# Patient Record
Sex: FEMALE | Race: WHITE
[De-identification: names, ages, dates, MRNs, and addresses within clinical notes are randomized per-mention and may not be internally consistent; named-entity substitution may affect disease eponyms.]

---

## 2018-08-20 ENCOUNTER — HOSPITAL ENCOUNTER (OUTPATIENT)
Dept: HOSPITAL 76 - EMS | Age: 78
Discharge: ANOTHER HOSPITAL | End: 2018-08-20
Attending: SURGERY
Payer: MEDICARE

## 2018-08-20 ENCOUNTER — HOSPITAL ENCOUNTER (OUTPATIENT)
Dept: HOSPITAL 76 - ED | Age: 78
Setting detail: OBSERVATION
LOS: 1 days | Discharge: HOME | End: 2018-08-21
Attending: ORTHOPAEDIC SURGERY | Admitting: ORTHOPAEDIC SURGERY
Payer: MEDICARE
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
## 2018-08-20 DIAGNOSIS — S82.851A: Primary | ICD-10-CM
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
## 2018-08-20 DIAGNOSIS — S99.911A: Primary | ICD-10-CM

## 2018-08-20 PROCEDURE — 99284 EMERGENCY DEPT VISIT MOD MDM: CPT

## 2018-08-20 PROCEDURE — 97162 PT EVAL MOD COMPLEX 30 MIN: CPT

## 2018-08-20 PROCEDURE — 99152 MOD SED SAME PHYS/QHP 5/>YRS: CPT

## 2018-08-20 PROCEDURE — 73600 X-RAY EXAM OF ANKLE: CPT

## 2018-08-20 PROCEDURE — 73610 X-RAY EXAM OF ANKLE: CPT

## 2018-08-20 PROCEDURE — 94770: CPT

## 2018-08-20 PROCEDURE — 27818 TREATMENT OF ANKLE FRACTURE: CPT

## 2018-08-20 PROCEDURE — 73590 X-RAY EXAM OF LOWER LEG: CPT

## 2018-08-20 PROCEDURE — 96374 THER/PROPH/DIAG INJ IV PUSH: CPT

## 2018-08-20 PROCEDURE — 97530 THERAPEUTIC ACTIVITIES: CPT

## 2018-08-20 RX ADMIN — ACETAMINOPHEN PRN MG: 500 TABLET ORAL at 21:19

## 2018-08-20 RX ADMIN — SODIUM CHLORIDE, PRESERVATIVE FREE SCH ML: 5 INJECTION INTRAVENOUS at 23:44

## 2018-08-20 NOTE — ED PHYSICIAN DOCUMENTATION
PD HPI LOWER EXT INJURY





- Stated complaint


Stated Complaint: FALL, R ANKLE PX





- Chief complaint


Chief Complaint: Trauma Ext





- History obtained from


History obtained from: Patient, Family





- History of Present Illness


PD HPI LOW EXT INJURY LOCATION: Right, Lower leg, Ankle


Type of injury: Fall (down stairs)


Where injury occurred: Home


Timing - onset: How many minutes ago (30)


Timing - duration: Minutes (30)


Timing - details: Abrupt onset


Pain level max: 9


Pain level now: 6


Improved by: Rest, Ice, Immobilization


Worsened by: Moving, Palpating


Associated symptoms: Swelling, Other (deformed).  No: Weakness, Numbness, 

Tingling


Contributing factors: No: Anticoagulated, Prior ortho surgery, Prosthetic joint

, Work related


Similar symptoms before: Has not had sx before


Recently seen: Not recently seen





Review of Systems


Ten Systems: 10 systems reviewed and negative


Constitutional: denies: Fever, Chills


Ears: denies: Ear pain


Nose: denies: Rhinorrhea / runny nose, Congestion


Throat: denies: Sore throat


Cardiac: denies: Chest pain / pressure


Respiratory: denies: Cough


GI: denies: Nausea, Vomiting, Diarrhea


Skin: denies: Rash


Musculoskeletal: denies: Neck pain, Back pain


Neurologic: denies: Headache, Head injury, LOC





PD PAST MEDICAL HISTORY





- Past Medical History


Past Medical History: Yes


Cardiovascular: High cholesterol





- Past Surgical History


Past Surgical History: No





- Present Medications


Home Medications: 


 Ambulatory Orders











 Medication  Instructions  Recorded  Confirmed


 


Atorvastatin [Lipitor]  08/20/18 














- Allergies


Allergies/Adverse Reactions: 


 Allergies











Allergy/AdvReac Type Severity Reaction Status Date / Time


 


No Known Drug Allergies Allergy   Verified 08/20/18 17:48














- Social History


Does the pt smoke?: No


Smoking Status: Never smoker


Does the pt drink ETOH?: Yes


Does the pt have substance abuse?: No





- Immunizations


Immunizations are current?: Yes





PD ED PE NORMAL





- Vitals


Vital signs reviewed: Yes





- General


General: Alert and oriented X 3, No acute distress, Well developed/nourished





- HEENT


HEENT: Moist mucous membranes





- Neck


Neck: Supple, no meningeal sign





- Cardiac


Cardiac: RRR





- Respiratory


Respiratory: No respiratory distress, Clear bilaterally





- Abdomen


Abdomen: Soft, Non tender, Non distended





- Back


Back: No spinal TTP





- Derm


Derm: Warm and dry





- Extremities


Extremities: Other (gross deformity to the R ankle c/w fracture and 

dislocation. no open fracture. NVI. strong DP pulse. )





- Neuro


Neuro: Alert and oriented X 3





- Psych


Psych: Normal mood, Normal affect





Results





- Vitals


Vitals: 


 Vital Signs - 24 hr











  08/20/18 08/20/18 08/20/18





  17:46 19:44 19:45


 


Temperature 36.1 C L  


 


Heart Rate 89 81 80


 


Respiratory 15 18 12





Rate   


 


Blood Pressure 105/72 125/74 


 


O2 Saturation 95 96 














  08/20/18 08/20/18 08/20/18





  19:47 19:52 20:01


 


Temperature   


 


Heart Rate 77 67 76


 


Respiratory 14 14 16





Rate   


 


Blood Pressure 123/70 113/61 116/60


 


O2 Saturation 95 94 100














  08/20/18





  20:13


 


Temperature 


 


Heart Rate 77


 


Respiratory 18





Rate 


 


Blood Pressure 120/70


 


O2 Saturation 94








 Oxygen











O2 Source                      Room air

















- Rads (name of study)


  ** R ankle xray


Radiology: Prelim report reviewed, EMP read contemporaneously, See rad report (

Trimalleolar fracture dislocation of the ankle. )





  ** r tib fib xray


Radiology: Prelim report reviewed, EMP read contemporaneously, See rad report (

Trimalleolar fracture dislocation of the ankle. )





  ** post reduction ankle


Radiology: Prelim report reviewed, EMP read contemporaneously, See rad report (

Interval placement of overlying cast status post reduction right ankle fracture 

dislocation.  Alignment of the ankle is near-anatomic.  No new abnormalities 

seen)





Procedures





- Procedural sedation


Sedation prep: Informed consent, Time out completed, Last meal (8hrs PTA), PE 

performed, AHA 1 - healthy, IV O2 monitor, ET CO2 monitor, RT present


Sedation medications: propofol, given by MD


Patient status during sedation: Responds to tactile, Vitals remained stable, 

Maintained airway, Recovered uneventfully


Sedation recovery: Recovered uneventfully





PD MEDICAL DECISION MAKING





- ED course


Complexity details: reviewed results, re-evaluated patient, considered 

differential, d/w patient, d/w consultant (Dr. Guerrreo -ortho)


ED course: 





Patient is a 78-year-old female who presents to the emergency department with a 

right ankle trimalleolar fracture/dislocation.  Discussed the case with 

orthopedics Dr. Guerrero, Who came and evaluated the patient in the emergency 

department.  I provided sedation with propofol while he reduced the dislocation 

and fracture.  She was placed in a molded plaster splint. Neurovascularly 

intact after splint application and reduction. She will be placed in 

observation overnight for compartment checks and pain control.





This document was made in part using voice recognition software. While efforts 

are made to proofread this document, sound alike and grammatical errors may 

occur.








- Sepsis Event


Vital Signs: 


 Vital Signs - 24 hr











  08/20/18 08/20/18 08/20/18





  17:46 19:44 19:45


 


Temperature 36.1 C L  


 


Heart Rate 89 81 80


 


Respiratory 15 18 12





Rate   


 


Blood Pressure 105/72 125/74 


 


O2 Saturation 95 96 














  08/20/18 08/20/18 08/20/18





  19:47 19:52 20:01


 


Temperature   


 


Heart Rate 77 67 76


 


Respiratory 14 14 16





Rate   


 


Blood Pressure 123/70 113/61 116/60


 


O2 Saturation 95 94 100














  08/20/18





  20:13


 


Temperature 


 


Heart Rate 77


 


Respiratory 18





Rate 


 


Blood Pressure 120/70


 


O2 Saturation 94








 Oxygen











O2 Source                      Room air

















Departure





- Departure


Disposition: ED Place in Observation


Clinical Impression: 


Trimalleolar fracture


Qualifiers:


 Encounter type: initial encounter Fracture type: closed Laterality: right 

Qualified Code(s): S82.851A - Displaced trimalleolar fracture of right lower leg

, initial encounter for closed fracture





Dislocation, ankle


Qualifiers:


 Encounter type: initial encounter Laterality: right Qualified Code(s): 

S93.04XA - Dislocation of right ankle joint, initial encounter





Condition: Stable


Discharge Date/Time: 08/20/18 21:08

## 2018-08-20 NOTE — XRAY REPORT
Procedure Date:  08/20/2018   

Accession Number:  [id number] / [id number]                    

Procedure:  XR  - Ankle 2 View RT CPT Code:  

 

FULL RESULT:

 

 

EXAM:

RIGHT ANKLE RADIOGRAPHY

 

EXAM DATE: 8/20/2018 06:54 PM.

 

CLINICAL HISTORY: FALL, ANKLE PAIN AND DEFORMITY.

 

COMPARISON: None.

 

TECHNIQUE: 3 views.

 

FINDINGS:

Bones: There is comminuted fracture through the distal fibula and above 

the level of the syndesmosis. There is displaced fracture through the 

medial malleolus which is transversely oriented and since at the medial 

margin of the tibial plateau. There is displaced fracture through the 

posterior malleolus.

 

Joints: There is lateral dislocation through the tibiotalar joint.

 

Soft Tissues:  No unexpected soft tissue findings.

IMPRESSION: Perez B fracture dislocation through the right ankle as above.

 

RADIA

## 2018-08-20 NOTE — XRAY REPORT
Procedure Date:  08/20/2018   

Accession Number:  [id number] / [id number]                    

Procedure:  XR  - Ankle 3 View RT CPT Code:  

 

FULL RESULT:

 

 

EXAM:

RIGHT ANKLE RADIOGRAPHY

 

EXAM DATE: 8/20/2018 08:05 PM.

 

CLINICAL HISTORY: S/p reduction.

 

COMPARISON: ANKLE 3 VIEW RT 08/20/2018.

 

TECHNIQUE: 3 views.

 

FINDINGS: Interval placement overlying cast status post reduction right 

ankle fracture dislocation. Alignment of the ankle is near anatomic. No 

new abnormalities are seen.

IMPRESSION: Interval placement overlying cast status post reduction right 

ankle fracture dislocation. Alignment of the ankle is near anatomic. No 

new abnormalities are seen.

 

[facility]

## 2018-08-20 NOTE — XRAY REPORT
Procedure Date:  08/20/2018   

Accession Number:  [id number] / [id number]                    

Procedure:  XR  - Tib/Fib RT CPT Code:  

 

FULL RESULT:

 

 

EXAM: RIGHT TIBIA/FIBULA RADIOGRAPHY.

 

EXAM DATE: 08/20/2018 06:46 PM.

 

CLINICAL HISTORY: Fall today. Pain and swelling.

 

COMPARISON: None.

 

TECHNIQUE: 3 views.

 

FINDINGS:

Bones: Trimalleolar fracture dislocation of the ankle with complete 

disruption of the ankle mortise.

 

Joints: Normal right knee.

 

Soft Tissues: Moderate edema lower leg and ankle.

IMPRESSION: Trimalleolar fracture dislocation of the ankle.

 

RADIA

## 2018-08-21 VITALS — DIASTOLIC BLOOD PRESSURE: 47 MMHG | SYSTOLIC BLOOD PRESSURE: 114 MMHG

## 2018-08-21 RX ADMIN — OXYCODONE PRN MG: 5 TABLET ORAL at 02:26

## 2018-08-21 RX ADMIN — OXYCODONE PRN MG: 5 TABLET ORAL at 06:34

## 2018-08-21 RX ADMIN — ACETAMINOPHEN PRN MG: 500 TABLET ORAL at 09:13

## 2018-08-21 RX ADMIN — SODIUM CHLORIDE, PRESERVATIVE FREE SCH ML: 5 INJECTION INTRAVENOUS at 09:14

## 2018-08-21 RX ADMIN — OXYCODONE PRN MG: 5 TABLET ORAL at 13:08

## 2018-08-21 NOTE — DISCHARGE PLAN
Discharge Plan


Disposition: 01 Home, Self Care


Diet: Regular


Activity Restrictions: non weight bearing RLE, assist device/assist prn


Shower Restrictions: Yes (keep splint dry/clean)


Assistance Devices: Crutches (crutches or walker with assist for amb, pending 

PT will follow-up with orthopedist in home town/near home per preference, 

recommend visit <3days)


No Smoking: If you smoke, Please STOP!  Call [phone] for help.


Follow-up with: 


Provider,Other [Primary Care Provider] -

## 2018-08-21 NOTE — CONSULTATION NOTE
DATE OF SERVICE: 08/20/2018

Physician: Chandler Lakhani MD

 

CHIEF COMPLAINT:  Asked to evaluate patient in the emergency room by Zion Whipple MD.

 

CONSULTATION REQUESTED BY:  Zion Whipple MD, for right ankle fracture dislocation, closed.

 

HISTORY OF PRESENT ILLNESS:  Patient is an active, 78-year-old female visiting from out of town with 
friends.  She injured her right ankle while walking down the stairs in hiking boot.  She twisted her 
ankle, realized it was out of shape and ultimately was brought into the emergency room by EMS.  Ortho
pedic consultation was sought.  The patient has a history of fracture dislocation of the ankle.

 

Patient is seen with her friend, Joseline, at the bedside.  Patient reports being relatively comfortabl
e if she does not move the ankle.  She points to the right ankle, where she does have some mild pain 
and deformity.  She denies other traumatic complaints at this time.  She has no other previous histor
y of right ankle issues.

 

PAST MEDICAL HISTORY:  Hypercholesterolemia.

 

PAST SURGICAL HISTORY:  None noted in EMR.

 

MEDICATIONS:  Atorvastatin 10 mg daily.

 

ALLERGIES:  NO KNOWN DRUG ALLERGIES.

 

REVIEW OF SYSTEMS:  A 14-point review of systems is negative other than for that noted.

 

FAMILY HISTORY:  Not obtained.

 

PHYSICAL EXAMINATION

GENERAL:  Patient is a well-developed, well-nourished, 78-year-old female in no acute distress.  She 
is cooperative with the examination.

HEAD AND NECK:  Normocephalic, atraumatic.

EXTREMITIES:  Patient's right lower extremity is noted to have a deformed ankle, with the ankle in re
lative eversion/abduction relative to the proximal aspect.  There is a small abrasion proximal and me
dial to the medial malleolus.  This is away from the bony prominence.  This was examined and noted to
 be superficial and less than 3 x 3 mm.  No penetration appreciated here.  No bleeding noted here.  N
o other skin violation noted.  The skin is somewhat tented towards the medial malleolus, as the major
ity of the foot is further lateral.  Please see procedure note under separate cover.

 

Foot compartments and calf compartments are soft.  There is minimal foot and ankle swelling.  There i
s palpable dorsalis pedis.  Patient has sensation intact throughout the foot and ankle, plantar, dors
um, medial and lateral borders as well as the toes and first webspace.  She remains comfortable, able
 to move her toes comfortably with unchanged neurovascular status distally postprocedure.

 

X-RAY EXAMINATION:  Patient had pre- and postreduction films which show a fracture-dislocation which 
is a trimalleolar fracture.  It involved a large medial malleolus fragment and a somewhat smaller pos
terior malleolar fragment and a comminuted lateral malleolus fracture.  Prereduction shows lateral di
slocation of its posterior component and postreduction film shows that the talus is underneath the an
kle mortise with good clear space equal across the talotibial joint.  The medial malleolus remains st
ill somewhat displaced.  The lateral view shows that the talus is underneath the tibia.  Please see r
adiograph final report for further details.

 

ASSESSMENT AND PLAN:  Patient is a 78-year-old female with a right ankle trimalleolar fracture disloc
ation, closed.

 

We did discuss the potential for short-term and long-term problems with this injury with and without 
surgery.  We talked about the natural history and relevant literature.  We talked about the examinati
on which does not suggest an open injury, though this is considered.  I have recommended operative in
tervention and the rationale for that is reviewed.  We talked about timing for that and offered opera
tive intervention today, which I think is reasonable given the minimal swelling at this point and the
 good reduction.  Patient does live out of town and has better support near where she lives elsewhere
 in [place].  After a discussion of the pros and cons, she would like to pursue treatment elsewher
e.  That said, we did discuss the potential for fracture blisters and other skin complications, as we
ll as other potential complications with her injury.  We talked about potential prolonged course and 
potential delays that may be necessary, depending on her soft tissues.  In the meantime, she reports 
being comfortable in the splint and will be admitted for observation for neurovascular checks, pain c
ontrol and vital sign checks.  We will have her elevate her right lower extremity and keep the splint
 clean, dry and intact.  She would stay nonweightbearing.  She would use a walker with assistance and
 assist device as needed.  We will have Physical Therapy train her with that tomorrow and then, per h
er preference, she would be discharged with the plan for care at an institutional hospital with an or
thopedist closer to her home.

 

Patient's and her friend's questions were answered.  We also spoke to her  on the phone.  His 
questions were answered, and verbalized understanding and agreement with the plan as outlined.  We wo
uld see her in the morning or sooner as needed and then plan for discharge as above.  This would be p
ending successful physical therapy or demonstration of safe ambulation and transfers for home setting
.

 

 

DD: 08/20/2018 21:07

TD: 08/20/2018 21:20

Job #: [id number]

## 2018-08-21 NOTE — PROCEDURE REPORT
DATE OF SERVICE: 08/20/2018

Physician: Chandler Lakhani MD

 

PREPROCEDURE DIAGNOSIS:  Closed right trimalleolar fracture dislocation of ankle.

 

POSTPROCEDURE DIAGNOSIS:  Closed right trimalleolar fracture dislocation of ankle.

 

PROCEDURE:  Right ankle closed reduction and splinting of fracture dislocation.

 

PROCEDURAL INDICATIONS:  A comminuted displaced dislocated right ankle fracture dislocation.

 

INTRAPROCEDURAL COMPLICATIONS:  None noted.

 

The patient and the patient's friend, Joseline, had the injury discussed.  We discussed risks, benefits
 and alternatives of the procedure proposed.  We talked about potential risks of her injury, as well 
as other procedure including but not limited to further injury to nerve or blood vessel injury, worse
faye of her condition, failure to "cure" problem, need for additional procedures, iatrogenic injury. 
 We talked about the fact that there are other risks not addressed her and that she would most certai
nly need definitive treatment.  She verbalized understanding of the above, verbalized the risks of th
e procedure.  Informed consent was given.

 

ANESTHESIA:  Per Zion Whipple MD, with conscious sedation.  Please see dictation under separate co
rony.

 

PROCEDURE IN DETAIL:  After informed consent was given, patient is adequately sedated with conscious 
sedation per Dr. Whipple.  The patient's right ankle is identified as the appropriate procedure, site 
and, after adequate sedation, the patient has traction, inversion and anterior drawer maneuver perfor
med with the ankle, which easily reduces as there is adequate relaxation.  This is held by the assist
ant as soft roll padding is placed circumferentially to appropriately pad bony prominences.  At this 
point, a well-padded and well-molded plaster splint is applied and ultimately an Ace wrap is applied 
without point finger pressure, but with broad surfaces.  The reduction is maintained with anterior dr
awchantal and inversion while molding the splint as it hardens.  Once adequately hardened, the patient is 
awakened and noted to be awake and alert.  She reports being comfortable in the new splint and remain
s neurovascularly unchanged distally.  She is given post splinting instructions.

 

 

DD: 08/20/2018 21:11

TD: 08/20/2018 21:18

Job #: [id number]